# Patient Record
Sex: MALE | Race: WHITE | NOT HISPANIC OR LATINO
[De-identification: names, ages, dates, MRNs, and addresses within clinical notes are randomized per-mention and may not be internally consistent; named-entity substitution may affect disease eponyms.]

---

## 2021-12-03 ENCOUNTER — TRANSCRIPTION ENCOUNTER (OUTPATIENT)
Age: 71
End: 2021-12-03

## 2022-12-07 PROBLEM — Z00.00 ENCOUNTER FOR PREVENTIVE HEALTH EXAMINATION: Status: ACTIVE | Noted: 2022-12-07

## 2022-12-09 ENCOUNTER — APPOINTMENT (OUTPATIENT)
Dept: ORTHOPEDIC SURGERY | Facility: CLINIC | Age: 72
End: 2022-12-09

## 2022-12-09 VITALS — HEIGHT: 69 IN | WEIGHT: 196 LBS | BODY MASS INDEX: 29.03 KG/M2

## 2022-12-09 DIAGNOSIS — M25.611 STIFFNESS OF RIGHT SHOULDER, NOT ELSEWHERE CLASSIFIED: ICD-10-CM

## 2022-12-09 DIAGNOSIS — M54.16 RADICULOPATHY, LUMBAR REGION: ICD-10-CM

## 2022-12-09 DIAGNOSIS — E78.00 PURE HYPERCHOLESTEROLEMIA, UNSPECIFIED: ICD-10-CM

## 2022-12-09 DIAGNOSIS — M47.816 SPONDYLOSIS W/OUT MYELOPATHY OR RADICULOPATHY, LUMBAR REGION: ICD-10-CM

## 2022-12-09 PROCEDURE — 72100 X-RAY EXAM L-S SPINE 2/3 VWS: CPT

## 2022-12-09 PROCEDURE — 72170 X-RAY EXAM OF PELVIS: CPT

## 2022-12-09 RX ORDER — MELOXICAM 15 MG/1
15 TABLET ORAL DAILY
Qty: 30 | Refills: 0 | Status: COMPLETED | COMMUNITY
Start: 2022-12-09 | End: 2023-01-08

## 2022-12-09 NOTE — PHYSICAL EXAM
[Flexion] : flexion [Extension] : extension [Bending to left] : bending to left [Bending to right] : bending to right [5___] : right extensor hallicus longus 5[unfilled]/5 [AP] : anteroposterior [There are no fractures, subluxations or dislocations. No significant abnormalities are seen] : There are no fractures, subluxations or dislocations. No significant abnormalities are seen [FreeTextEntry1] : Multilevel degenerative changes. [Right] : right shoulder [4 ___] : forward flexion 4[unfilled]/5 [4___] : internal rotation 4[unfilled]/5 [] : motor and sensory intact distally [FreeTextEntry9] : MIld decreased ROM in all directions.

## 2022-12-09 NOTE — ASSESSMENT
[FreeTextEntry1] : Due to chronicity and radicular complaints, recommend MRI L/S to evaluate for HNP/stenosis.\par \par Recommend a course of PT for the lower back and right shoulder.\par meloxciam rx sent.\par \par The patient was explained the options as well as benefits of over the counter verses prescription strength nonsteroidal anti-inflammatory medication. The patient opts for a prescription strength medication.\par

## 2022-12-09 NOTE — HISTORY OF PRESENT ILLNESS
[Lower back] : lower back [Result of repetitive motion] : result of repetitive motion [5] : 5 [Stabbing] : stabbing [Tingling] : tingling [Meds] : meds [de-identified] : Pt. is a 71 year old male who presents for evaluation of his lumbar spine. Denies trauma. Intermittent pain x years getting progressively worse. He reports intermittent numbness/tingling and pain that radiates down both legs. No formal treatment to date. He presents with xrays from 2019 which reveal degenerative changes. No issues controlling bowel/bladder. No fevers, chills, sweats.  [] : no [FreeTextEntry5] : pt is a 71 year old male coming in with lower back pain. no reported injury, pain has been progressing over the years.  [FreeTextEntry7] : into the buttock and right leg  [de-identified] : 2019 [de-identified] : MRI

## 2023-08-29 ENCOUNTER — RX RENEWAL (OUTPATIENT)
Age: 73
End: 2023-08-29

## 2023-08-29 RX ORDER — MELOXICAM 15 MG/1
15 TABLET ORAL
Qty: 30 | Refills: 0 | Status: ACTIVE | COMMUNITY
Start: 2023-01-30 | End: 1900-01-01

## 2024-05-27 ENCOUNTER — NON-APPOINTMENT (OUTPATIENT)
Age: 74
End: 2024-05-27